# Patient Record
Sex: MALE | Race: OTHER | Employment: FULL TIME | URBAN - METROPOLITAN AREA
[De-identification: names, ages, dates, MRNs, and addresses within clinical notes are randomized per-mention and may not be internally consistent; named-entity substitution may affect disease eponyms.]

---

## 2017-01-23 ENCOUNTER — HOSPITAL ENCOUNTER (OUTPATIENT)
Dept: GENERAL RADIOLOGY | Age: 42
Discharge: HOME | End: 2017-01-23
Attending: PHYSICIAN ASSISTANT

## 2017-01-23 ENCOUNTER — OFFICE VISIT (OUTPATIENT)
Dept: OCCUPATIONAL MEDICINE | Age: 42
End: 2017-01-23
Attending: PHYSICIAN ASSISTANT

## 2017-01-23 DIAGNOSIS — S39.012A LOW BACK STRAIN, INITIAL ENCOUNTER: Primary | ICD-10-CM

## 2017-01-23 DIAGNOSIS — S39.012A LOW BACK STRAIN, INITIAL ENCOUNTER: ICD-10-CM

## 2017-01-23 PROCEDURE — 72110 X-RAY EXAM L-2 SPINE 4/>VWS: CPT

## 2017-01-23 RX ORDER — CYCLOBENZAPRINE HCL 10 MG
10 TABLET ORAL NIGHTLY
Qty: 20 TABLET | Refills: 0 | Status: SHIPPED | OUTPATIENT
Start: 2017-01-23 | End: 2017-02-12

## 2017-01-23 RX ORDER — KETOROLAC TROMETHAMINE 30 MG/ML
60 INJECTION, SOLUTION INTRAMUSCULAR; INTRAVENOUS ONCE
Status: COMPLETED | OUTPATIENT
Start: 2017-01-23 | End: 2017-01-23

## 2017-01-23 RX ORDER — IBUPROFEN 800 MG/1
800 TABLET ORAL 3 TIMES DAILY
Qty: 30 TABLET | Refills: 0 | Status: SHIPPED | OUTPATIENT
Start: 2017-01-23 | End: 2017-02-02

## 2017-01-23 RX ADMIN — KETOROLAC TROMETHAMINE 60 MG: 30 INJECTION, SOLUTION INTRAMUSCULAR; INTRAVENOUS at 12:31:00

## 2017-01-23 NOTE — PATIENT INSTRUCTIONS
Lumbar Rotation    1. Lie on your back on the floor, with your knees bent and your feet flat on the floor. Don’t press your neck or lower back to the floor. 2. Lean both of your knees to one side. Turn your head in the opposite direction.  Keep your shou © 4045-0435 The [address] [place] [address], [address]. All rights reserved. This information is not intended as a substitute for professional medical care. Always follow your healthcare professional's instructions.         Lumbar

## 2017-01-27 ENCOUNTER — OFFICE VISIT (OUTPATIENT)
Dept: OCCUPATIONAL MEDICINE | Age: 42
End: 2017-01-27
Attending: PHYSICIAN ASSISTANT

## 2017-01-27 DIAGNOSIS — S39.012D LOW BACK STRAIN, SUBSEQUENT ENCOUNTER: Primary | ICD-10-CM

## 2017-02-01 ENCOUNTER — OFFICE VISIT (OUTPATIENT)
Dept: OCCUPATIONAL MEDICINE | Age: 42
End: 2017-02-01
Attending: PHYSICIAN ASSISTANT

## 2017-02-01 DIAGNOSIS — S39.012D LOW BACK STRAIN, SUBSEQUENT ENCOUNTER: Primary | ICD-10-CM

## 2019-04-22 ENCOUNTER — HOSPITAL ENCOUNTER (OUTPATIENT)
Age: 44
Discharge: HOME | End: 2019-04-22
Payer: COMMERCIAL

## 2019-04-22 ENCOUNTER — APPOINTMENT (OUTPATIENT)
Dept: CT IMAGING | Age: 44
End: 2019-04-22
Attending: NURSE PRACTITIONER
Payer: COMMERCIAL

## 2019-04-22 VITALS
RESPIRATION RATE: 18 BRPM | HEART RATE: 90 BPM | OXYGEN SATURATION: 97 % | DIASTOLIC BLOOD PRESSURE: 83 MMHG | HEIGHT: 69 IN | BODY MASS INDEX: 32.58 KG/M2 | WEIGHT: 220 LBS | SYSTOLIC BLOOD PRESSURE: 118 MMHG | TEMPERATURE: 99 F

## 2019-04-22 DIAGNOSIS — G93.0 ARACHNOID CYST: ICD-10-CM

## 2019-04-22 DIAGNOSIS — M54.16 LUMBAR RADICULOPATHY: Primary | ICD-10-CM

## 2019-04-22 DIAGNOSIS — J32.9 SINUSITIS, UNSPECIFIED CHRONICITY, UNSPECIFIED LOCATION: ICD-10-CM

## 2019-04-22 PROCEDURE — 96372 THER/PROPH/DIAG INJ SC/IM: CPT

## 2019-04-22 PROCEDURE — 70450 CT HEAD/BRAIN W/O DYE: CPT | Performed by: NURSE PRACTITIONER

## 2019-04-22 PROCEDURE — 99214 OFFICE O/P EST MOD 30 MIN: CPT

## 2019-04-22 PROCEDURE — 99204 OFFICE O/P NEW MOD 45 MIN: CPT

## 2019-04-22 RX ORDER — AMOXICILLIN AND CLAVULANATE POTASSIUM 875; 125 MG/1; MG/1
1 TABLET, FILM COATED ORAL 2 TIMES DAILY
Qty: 20 TABLET | Refills: 0 | Status: SHIPPED | OUTPATIENT
Start: 2019-04-22 | End: 2019-05-02

## 2019-04-22 RX ORDER — CYCLOBENZAPRINE HCL 10 MG
10 TABLET ORAL 3 TIMES DAILY PRN
Qty: 20 TABLET | Refills: 0 | Status: SHIPPED | OUTPATIENT
Start: 2019-04-22 | End: 2019-04-29

## 2019-04-22 RX ORDER — KETOROLAC TROMETHAMINE 30 MG/ML
30 INJECTION, SOLUTION INTRAMUSCULAR; INTRAVENOUS ONCE
Status: COMPLETED | OUTPATIENT
Start: 2019-04-22 | End: 2019-04-22

## 2019-04-22 RX ORDER — NAPROXEN 500 MG/1
500 TABLET ORAL 2 TIMES DAILY PRN
Qty: 20 TABLET | Refills: 0 | Status: SHIPPED | OUTPATIENT
Start: 2019-04-22 | End: 2019-04-29

## 2019-04-22 NOTE — ED PROVIDER NOTES
Patient Seen in: [facility]    History   Patient presents with:  Cough/URI  Back Pain (musculoskeletal)    Stated Complaint: cough,congestion    HPI  Patient is a 26-year-old male without significant medical history who presents Denies personal or family history of cerebral aneurysms or tumors. Denies trauma. Denies photophobia or nuchal rigidity. Denies fever. Denies nausea vomiting chest pain or shortness of breath. Denies history of ulcer disease.   Denies melena or hemat Mouth/Throat: Uvula is midline, oropharynx is clear and moist and mucous membranes are normal. No oral lesions. No trismus in the jaw. No uvula swelling (uvula slightly enlarged).  No oropharyngeal exudate, posterior oropharyngeal edema or posterior orophar CN II-XII intact    No facial asymmetry, speech normal  Cerebellar testing (finger to nose, rapid alternating hand, heel to shin) normal     Romberg negative  Gait normal    Strength equal and intact bilaterally in upper and lower extremities without focal CONCLUSION:  No acute intracranial abnormality is seen. If clinical symptoms persist then consider MRI.   Fluid attenuation structure within the anterior aspect of the right middle cranial fossa has characteristics suggestive of a moderate to large arachno make appointment with this doctor if you are no longer aligned with your own primary care doctor    Preet Reagan, [address]  [address] [facility] [address]          [facility], [address] [place] [address] Dr Conrad 22-Aug-2018

## 2019-04-22 NOTE — ED INITIAL ASSESSMENT (HPI)
Patient reports he was sick since Wednesday. Patient reports he had a nose bleed that lasted for about 1 hour. Patient reports he didn't go to work on Thursday or Friday. Patient reports his boss wants a note stating he is ok to go to work.  Patient repo

## (undated) NOTE — MR AVS SNAPSHOT
After Visit Summary   1/23/2017    Alden Walls    MRN: [id number]           Visit Information        Provider Department Dept Phone    1/23/2017 11:45 AM [facility]       Follow-up Information     None      Diagnoses on the floor. Don’t press your neck or lower back to the floor. 11. Lean both of your knees to one side. Turn your head in the opposite direction. Keep your shoulders flat on the floor. Be gentle and don’t push through pain. 12. Hold for 20 seconds.  Then information, go to [email or web address] and click on the Sign Up Now link in the [employer] box. Enter your [employer] Activation Code exactly as it appears below along with your Zip Code and Date of Birth to complete the sign-up process.  If you do

## (undated) NOTE — LETTER
Date & Time: 4/22/2019, 5:10 PM  Patient: Lazarus Sprinkle  Encounter Provider(s):    ELTON Melgar       To Whom It May Concern:    Noemy Mishra was seen and treated in our department on 4/22/2019. He should not return to work until 04/23/2019.     If